# Patient Record
Sex: FEMALE | Race: WHITE | Employment: FULL TIME | URBAN - METROPOLITAN AREA
[De-identification: names, ages, dates, MRNs, and addresses within clinical notes are randomized per-mention and may not be internally consistent; named-entity substitution may affect disease eponyms.]

---

## 2018-07-16 ENCOUNTER — TRANSFERRED RECORDS (OUTPATIENT)
Dept: HEALTH INFORMATION MANAGEMENT | Facility: CLINIC | Age: 38
End: 2018-07-16

## 2018-07-17 LAB
CHOLEST SERPL-MCNC: 245 MG/DL (ref 100–199)
HDLC SERPL-MCNC: 39 MG/DL (ref 39–?)
LDLC SERPL CALC-MCNC: ABNORMAL MG/DL
TRIGL SERPL-MCNC: 448 MG/DL (ref 0–149)

## 2018-10-19 PROBLEM — F17.200 CURRENT SMOKER: Status: ACTIVE | Noted: 2018-10-19

## 2018-10-19 PROBLEM — E78.2 MIXED HYPERLIPIDEMIA: Status: ACTIVE | Noted: 2018-10-19

## 2018-10-19 PROBLEM — K21.00 GASTROESOPHAGEAL REFLUX DISEASE WITH ESOPHAGITIS: Status: ACTIVE | Noted: 2018-10-19

## 2018-10-19 PROBLEM — D25.9 FIBROID UTERUS: Status: ACTIVE | Noted: 2018-10-19

## 2018-10-23 ENCOUNTER — DOCUMENTATION ONLY (OUTPATIENT)
Dept: CARDIOLOGY | Facility: CLINIC | Age: 38
End: 2018-10-23

## 2018-11-21 ENCOUNTER — DOCUMENTATION ONLY (OUTPATIENT)
Dept: CARDIOLOGY | Facility: CLINIC | Age: 38
End: 2018-11-21

## 2018-11-28 ENCOUNTER — OFFICE VISIT (OUTPATIENT)
Dept: CARDIOLOGY | Facility: CLINIC | Age: 38
End: 2018-11-28
Payer: COMMERCIAL

## 2018-11-28 VITALS
DIASTOLIC BLOOD PRESSURE: 76 MMHG | WEIGHT: 189 LBS | SYSTOLIC BLOOD PRESSURE: 132 MMHG | OXYGEN SATURATION: 96 % | HEIGHT: 67 IN | HEART RATE: 72 BPM | BODY MASS INDEX: 29.66 KG/M2

## 2018-11-28 DIAGNOSIS — R94.31 ABNORMAL ELECTROCARDIOGRAM: ICD-10-CM

## 2018-11-28 DIAGNOSIS — E78.2 MIXED HYPERLIPIDEMIA: Primary | ICD-10-CM

## 2018-11-28 PROCEDURE — 99204 OFFICE O/P NEW MOD 45 MIN: CPT | Mod: 25 | Performed by: INTERNAL MEDICINE

## 2018-11-28 PROCEDURE — 93000 ELECTROCARDIOGRAM COMPLETE: CPT | Performed by: INTERNAL MEDICINE

## 2018-11-28 RX ORDER — ATORVASTATIN CALCIUM 20 MG/1
20 TABLET, FILM COATED ORAL DAILY
Qty: 90 TABLET | Refills: 3 | Status: SHIPPED | OUTPATIENT
Start: 2018-11-28 | End: 2019-03-25

## 2018-11-28 NOTE — PROGRESS NOTES
HPI and Plan:   See dictation(#[id number])    Orders Placed This Encounter   Procedures     Lipid Profile     ALT     Follow-Up with Cardiologist     EKG 12-lead complete w/read - Clinics (performed today)     Exercise Stress Echocardiogram       Orders Placed This Encounter   Medications     FENOFIBRATE PO     Sig: Take 160 mg by mouth daily     Fexofenadine HCl (ALLEGRA PO)     Sig: Take by mouth daily     atorvastatin (LIPITOR) 20 MG tablet     Sig: Take 1 tablet (20 mg) by mouth daily     Dispense:  90 tablet     Refill:  3       Medications Discontinued During This Encounter   Medication Reason     HYDROcodone-acetaminophen (NORCO) 5-325 MG per tablet Medication Reconciliation Clean Up         Encounter Diagnoses   Name Primary?     Mixed hyperlipidemia Yes     Abnormal electrocardiogram        CURRENT MEDICATIONS:  Current Outpatient Prescriptions   Medication Sig Dispense Refill     albuterol (PROVENTIL HFA: VENTOLIN HFA) 108 (90 BASE) MCG/ACT inhaler Inhale 2 puffs into the lungs every 6 hours.       atorvastatin (LIPITOR) 20 MG tablet Take 1 tablet (20 mg) by mouth daily 90 tablet 3     BUDESONIDE, INHALATION, 90 MCG/ACT AEPB Inhale  into the lungs.       FENOFIBRATE PO Take 160 mg by mouth daily       Fexofenadine HCl (ALLEGRA PO) Take by mouth daily       Omeprazole (PRILOSEC PO) Take 20 mg by mouth every other day        VITAMIN D, CHOLECALCIFEROL, PO Take 3,000 Units by mouth daily.         ALLERGIES     Allergies   Allergen Reactions     Cephalexin Hcl        PAST MEDICAL HISTORY:  Past Medical History:   Diagnosis Date     Asthma      Endometriosis      Gastro-oesophageal reflux disease      PONV (postoperative nausea and vomiting)     SCOP PATCH WORKS       PAST SURGICAL HISTORY:  Past Surgical History:   Procedure Laterality Date     LABIALPLASTY N/A 4/17/2015    Procedure: LABIAPLASTY;  Surgeon: Carlos Greer MD;  Location: [facility]     LAPAROSCOPIC ABLATION ENDOMETRIOSIS  6/7/2012    Procedure:  LAPAROSCOPIC ABLATION ENDOMETRIOSIS;  FULGERATION OF ENDOMETRIOSIS;  Surgeon: Carlos Greer MD;  Location: [facility]     LAPAROSCOPIC ABLATION ENDOMETRIOSIS  6/25/2014    Procedure: LAPAROSCOPIC ABLATION ENDOMETRIOSIS;  Surgeon: Carlos Greer MD;  Location:  OR     LAPAROSCOPIC TUBAL DYE STUDY  6/7/2012    Procedure: LAPAROSCOPIC TUBAL DYE STUDY;;  Surgeon: Carlos Greer MD;  Location:  [facility]     LAPAROSCOPIC TUBAL DYE STUDY  6/25/2014    Procedure: LAPAROSCOPIC TUBAL DYE STUDY;  Surgeon: Carlos Greer MD;  Location:  OR     LAPAROSCOPIC TUBAL DYE STUDY Bilateral 8/4/2015    Procedure: LAPAROSCOPIC TUBAL DYE STUDY;  Surgeon: Carlos Greer MD;  Location: [facility]     LAPAROSCOPY       LAPAROSCOPY DIAGNOSTIC (GYN)  6/7/2012    Procedure: LAPAROSCOPY DIAGNOSTIC (GYN);  LAPAROSCOPY, TUBAL DYE STUDY  WITH FULGERATION OF ENDOMETRIOSIS;  Surgeon: Carlos Greer MD;  Location: [facility]       FAMILY HISTORY:  History reviewed. No pertinent family history.    SOCIAL HISTORY:  Social History     Social History     Marital status:      Spouse name: N/A     Number of children: N/A     Years of education: N/A     Social History Main Topics     Smoking status: Current Every Day Smoker     Packs/day: 0.50     Smokeless tobacco: Never Used     Alcohol use Yes      Comment: RARELY      Drug use: No     Sexual activity: Yes     Partners: Male     Other Topics Concern     None     Social History Narrative       Review of Systems:  Skin:  Negative       Eyes:  Negative      ENT:  Negative      Respiratory:  Positive for shortness of breath asthma   Cardiovascular:  Negative Positive for    Gastroenterology: Negative      Genitourinary:  Negative      Musculoskeletal:  Negative      Neurologic:  Negative      Psychiatric:  Negative      Heme/Lymph/Imm:  Negative      Endocrine:  Negative        Physical Exam:  Vitals: /76 (BP Location: Left arm, Patient Position: Chair, Cuff Size: Adult Large)  Pulse 72  Ht 1.702 m (5'  "7\")  Wt 85.7 kg (189 lb)  SpO2 96%  BMI 29.6 kg/m2    Constitutional:           Skin:             Head:           Eyes:           Lymph:      ENT:           Neck:           Respiratory:            Cardiac:                                                           GI:           Extremities and Muscular Skeletal:                 Neurological:           Psych:             [facility]  [facility]  [address] [place] [address]                  "

## 2018-11-28 NOTE — LETTER
11/28/2018      HEATHER HERNÁNDEZ  [facility] [address]  [facility] [facility] [id number]      RE: Beatrice GT Hickman       Dear Colleague,    I had the pleasure of seeing Beatrice Hickman in the [facility].    Service Date: 11/28/2018      OFFICE PROGRESS NOTE       REASON FOR CARDIOLOGY VISIT:  Dyslipidemia.       HISTORY OF PRESENT ILLNESS:  Ms. Hickman is a pleasant 38-year-old female who has history of mixed dyslipidemia, mainly in terms of elevated triglycerides.  Has seen Dr. Masters in the past.  She also has history of tobacco abuse which unfortunately is ongoing, about half pack per day, history of endometriosis, history of infertility for which she did try treatment for several years.  The patient had lipid panel done recently and the most recent one is from 07/24 that showed total cholesterol 213, triglyceride of 423, HDL of 37, direct .  This gives her 10-year ASCVD risk score of about 6.9%.  To be noted, the patient was on fenofibrate 50 mg daily.  This was increased to 160 mg daily around about the same time when she had the lipid panel done last time.  Today, she is coming to Cardiology Clinic.  I am seeing her for the first time.  The patient tells me that her triglycerides have been elevated for a long time.  Her grandmother also had very high triglycerides.  The patient does not have any history of known coronary artery disease, pancreatitis.  No alcohol consumption.  She did have exposure to lot of hormones, in terms of infertility treatment but has not had any exposure in last 1 year or so.  She used to be fairly active doing treadmill on a regular basis but she got very busy in selling her house which she recently sold and tells me that she has again started doing exercise which includes treadmill for up to an hour.  She does not get any particular chest discomfort, although tells me that she can sometimes get exercise-induced asthma which feels like a  tightness or shortness of breath in the chest.  Unfortunately, she continues to smoke half pack per day.      PHYSICAL EXAMINATION:    VITAL SIGNS:  Blood pressure 132/76, heart rate 72 and regular, weight 189 pounds, BMI 29.66.   GENERAL:  The patient appears pleasant, comfortable.   NECK:  Normal JVP, no bruit.   CARDIOVASCULAR SYSTEM:  S1, S2 normal, no murmur, rub or gallop.     RESPIRATORY SYSTEM:  Clear to auscultation bilaterally.   GASTROINTESTINAL SYSTEM:  Abdomen soft, nontender.     EXTREMITIES:  No pitting pedal edema.    NEUROLOGICAL:  Alert, oriented x 3.   PSYCHIATRIC:  Normal affect.    SKIN:  No obvious rash.    HEENT:  No pallor or icterus.        RADIOLOGIC STUDIES:  EKG done today, was personally reviewed by me.  Showed shows sinus rhythm with some Q waves in lead V1, V2.  No previous EKG to compare.      IMPRESSION AND PLAN:  A pleasant 38-year-old female with mixed dyslipidemia, mainly in terms of high triglycerides but also low HDL, with a personal history of tobacco abuse which unfortunately is ongoing.  No history of diabetes, hypertension or family history of premature coronary artery disease.  I had a long discussion with the patient regarding the mixed dyslipidemia.  Her 10-year ASCVD score is borderline elevated around 6.9%.  We discussed option of statin, pros and cons of statin and eventually decided to use moderate intensity statin of Lipitor 20 mg daily.  Common adverse effects of statin including but not limited to risk of myositis, myalgias, liver dysfunction were discussed with the patient.  If she has any of these symptoms, especially any dark-colored urine, she needs to call our clinic and I recommend discontinuing statin at that time.  Otherwise, we will recheck her lipid panel with ALT in about 6 weeks.  Regarding abnormal EKG, clinically, she does not appear to have any typical symptoms of coronary artery disease, although she can get some breathing discomfort, according to  the patient, when she exercises which she blames on asthma.  She had a negative stress test in .  We again discussed about repeating the stress test.  It has been 8 years and the patient is agreeable for that.  We will do an exercise stress echocardiogram.  I strongly recommended her to completely quit tobacco.  We will see her back with the exercise stress echocardiogram and fasting lipid panel in about six weeks or so.   1.  Mixed dyslipidemia, 10-year ASCVD risk score of around 6.9%.   2.  Abnormal EKG, possible Q waves in V1, V2.  Clinically, no typical anginal symptoms.   3.  Tobacco abuse, unfortunately ongoing.      RECOMMENDATIONS:   1.  Exercise stress echocardiogram.   2.  Lipitor 20 mg daily.     3.  Fasting lipid panel with ALT in about 6 weeks' time.   4.  Tobacco cessation strongly recommended.  Approximately 3 minutes were spent discussing about tobacco cessation.         SANDY WESLEY MD             D: 2018   T: 2018   MT: UZIEL      Name:     GLORIA GONZALEZ   MRN:      [id number]        Account:      [id number]   :      1980           Service Date: 2018      Document: [id number]         Outpatient Encounter Prescriptions as of 2018   Medication Sig Dispense Refill     albuterol (PROVENTIL HFA: VENTOLIN HFA) 108 (90 BASE) MCG/ACT inhaler Inhale 2 puffs into the lungs every 6 hours.       atorvastatin (LIPITOR) 20 MG tablet Take 1 tablet (20 mg) by mouth daily 90 tablet 3     BUDESONIDE, INHALATION, 90 MCG/ACT AEPB Inhale  into the lungs.       FENOFIBRATE PO Take 160 mg by mouth daily       Fexofenadine HCl (ALLEGRA PO) Take by mouth daily       Omeprazole (PRILOSEC PO) Take 20 mg by mouth every other day        VITAMIN D, CHOLECALCIFEROL, PO Take 3,000 Units by mouth daily.       [DISCONTINUED] HYDROcodone-acetaminophen (NORCO) 5-325 MG per tablet Take 1-2 tablets by mouth every 4 hours as needed for other (Moderate to Severe Pain) 10 tablet 0     No  facility-administered encounter medications on file as of 11/28/2018.        Again, thank you for allowing me to participate in the care of your patient.      Sincerely,    Mark Meza MD     [facility]

## 2018-11-28 NOTE — PROGRESS NOTES
Service Date: 11/28/2018      OFFICE PROGRESS NOTE       REASON FOR CARDIOLOGY VISIT:  Dyslipidemia.       HISTORY OF PRESENT ILLNESS:  Ms. Hickman is a pleasant 38-year-old female who has history of mixed dyslipidemia, mainly in terms of elevated triglycerides.  Has seen Dr. Mastesr in the past.  She also has history of tobacco abuse which unfortunately is ongoing, about half pack per day, history of endometriosis, history of infertility for which she did try treatment for several years.  The patient had lipid panel done recently and the most recent one is from 07/24 that showed total cholesterol 213, triglyceride of 423, HDL of 37, direct .  This gives her 10-year ASCVD risk score of about 6.9%.  To be noted, the patient was on fenofibrate 50 mg daily.  This was increased to 160 mg daily around about the same time when she had the lipid panel done last time.  Today, she is coming to Cardiology Clinic.  I am seeing her for the first time.  The patient tells me that her triglycerides have been elevated for a long time.  Her grandmother also had very high triglycerides.  The patient does not have any history of known coronary artery disease, pancreatitis.  No alcohol consumption.  She did have exposure to lot of hormones, in terms of infertility treatment but has not had any exposure in last 1 year or so.  She used to be fairly active doing treadmill on a regular basis but she got very busy in selling her house which she recently sold and tells me that she has again started doing exercise which includes treadmill for up to an hour.  She does not get any particular chest discomfort, although tells me that she can sometimes get exercise-induced asthma which feels like a tightness or shortness of breath in the chest.  Unfortunately, she continues to smoke half pack per day.      PHYSICAL EXAMINATION:    VITAL SIGNS:  Blood pressure 132/76, heart rate 72 and regular, weight 189 pounds, BMI 29.66.   GENERAL:   The patient appears pleasant, comfortable.   NECK:  Normal JVP, no bruit.   CARDIOVASCULAR SYSTEM:  S1, S2 normal, no murmur, rub or gallop.     RESPIRATORY SYSTEM:  Clear to auscultation bilaterally.   GASTROINTESTINAL SYSTEM:  Abdomen soft, nontender.     EXTREMITIES:  No pitting pedal edema.    NEUROLOGICAL:  Alert, oriented x 3.   PSYCHIATRIC:  Normal affect.    SKIN:  No obvious rash.    HEENT:  No pallor or icterus.        RADIOLOGIC STUDIES:  EKG done today, was personally reviewed by me.  Showed shows sinus rhythm with some Q waves in lead V1, V2.  No previous EKG to compare.      IMPRESSION AND PLAN:  A pleasant 38-year-old female with mixed dyslipidemia, mainly in terms of high triglycerides but also low HDL, with a personal history of tobacco abuse which unfortunately is ongoing.  No history of diabetes, hypertension or family history of premature coronary artery disease.  I had a long discussion with the patient regarding the mixed dyslipidemia.  Her 10-year ASCVD score is borderline elevated around 6.9%.  We discussed option of statin, pros and cons of statin and eventually decided to use moderate intensity statin of Lipitor 20 mg daily.  Common adverse effects of statin including but not limited to risk of myositis, myalgias, liver dysfunction were discussed with the patient.  If she has any of these symptoms, especially any dark-colored urine, she needs to call our clinic and I recommend discontinuing statin at that time.  Otherwise, we will recheck her lipid panel with ALT in about 6 weeks.  Regarding abnormal EKG, clinically, she does not appear to have any typical symptoms of coronary artery disease, although she can get some breathing discomfort, according to the patient, when she exercises which she blames on asthma.  She had a negative stress test in 2010.  We again discussed about repeating the stress test.  It has been 8 years and the patient is agreeable for that.  We will do an exercise  stress echocardiogram.  I strongly recommended her to completely quit tobacco.  We will see her back with the exercise stress echocardiogram and fasting lipid panel in about six weeks or so.   1.  Mixed dyslipidemia, 10-year ASCVD risk score of around 6.9%.   2.  Abnormal EKG, possible Q waves in V1, V2.  Clinically, no typical anginal symptoms.   3.  Tobacco abuse, unfortunately ongoing.      RECOMMENDATIONS:   1.  Exercise stress echocardiogram.   2.  Lipitor 20 mg daily.     3.  Fasting lipid panel with ALT in about 6 weeks' time.   4.  Tobacco cessation strongly recommended.  Approximately 3 minutes were spent discussing about tobacco cessation.         SANDY WESLEY MD             D: 2018   T: 2018   MT: UZIEL      Name:     GLORIA GONZALEZ   MRN:      [id number]        Account:      [id number]   :      1980           Service Date: 2018      Document: [id number]

## 2018-11-28 NOTE — MR AVS SNAPSHOT
"              After Visit Summary   11/28/2018    Beatrice Hickman    MRN: [id number]           Patient Information     Date Of Birth          1980        Visit Information        Provider Department      11/28/2018 8:45 AM Mark Meza MD [facility]        Today's Diagnoses     Mixed hyperlipidemia    -  1    Abnormal electrocardiogram           Follow-ups after your visit        Additional Services     Follow-Up with Cardiologist                 Future tests that were ordered for you today     Open Future Orders        Priority Expected Expires Ordered    Follow-Up with Cardiologist Routine 1/9/2019 11/28/2019 11/28/2018    Lipid Profile Routine 1/10/2019 11/28/2019 11/28/2018    ALT Routine 12/28/2018 11/28/2019 11/28/2018    Exercise Stress Echocardiogram Routine 12/5/2018 11/28/2019 11/28/2018            Who to contact     If you have questions or need follow up information about today's clinic visit or your schedule please contact [facility] directly at [phone].  Normal or non-critical lab and imaging results will be communicated to you by [employer], letter or phone within 4 business days after the clinic has received the results. If you do not hear from us within 7 days, please contact the clinic through dakickt or phone. If you have a critical or abnormal lab result, we will notify you by phone as soon as possible.  Submit refill requests through [employer] or call your pharmacy and they will forward the refill request to us. Please allow 3 business days for your refill to be completed.          Additional Information About Your Visit        [employer] Information     [employer] lets you send messages to your doctor, view your test results, renew your prescriptions, schedule appointments and more. To sign up, go to [email or web address] . Click on \"Log in\" on the left side of the screen, which will take you to the Welcome page. " "Then click on \"Sign up Now\" on the right side of the page.     You will be asked to enter the access code listed below, as well as some personal information. Please follow the directions to create your username and password.     Your access code is: [email or web address]  Expires: 2019  8:15 AM     Your access code will  in 90 days. If you need help or a new code, please call your [place] clinic or [phone].        Care EveryWhere ID     This is your Care EveryWhere ID. This could be used by other organizations to access your [place] medical records  [id number]        Your Vitals Were     Pulse Height Pulse Oximetry BMI (Body Mass Index)          72 1.702 m (5' 7\") 96% 29.6 kg/m2         Blood Pressure from Last 3 Encounters:   18 132/76   08/04/15 108/68   04/17/15 114/56    Weight from Last 3 Encounters:   18 85.7 kg (189 lb)   08/04/15 80 kg (176 lb 5.9 oz)   14 80.2 kg (176 lb 11.2 oz)              We Performed the Following     EKG 12-lead complete w/read - Clinics (performed today)          Today's Medication Changes          These changes are accurate as of 18  9:25 AM.  If you have any questions, ask your nurse or doctor.               Start taking these medicines.        Dose/Directions    atorvastatin 20 MG tablet   Commonly known as:  LIPITOR   Used for:  Mixed hyperlipidemia   Started by:  Mark Meza MD        Dose:  20 mg   Take 1 tablet (20 mg) by mouth daily   Quantity:  90 tablet   Refills:  3            Where to get your medicines      These medications were sent to [id number] - [facility] [address] AT [facility] OF [address] [place] [address]  [address] [facility] [address]     Phone:  [phone]     atorvastatin 20 MG tablet                Primary Care Provider Office Phone # Fax #    Nika Kyle [phone]       [address] [address]        Equal Access to Services     [facility]" GAAR : Hadii aad ku harris Mauro, waaxda luqadaha, qaybta katrue cox, ken chris randalllang rajanabidarebecca mckinney . So [facility] [phone].    ATENCIÓN: Si habla español, tiene a payne disposición servicios gratuitos de asistencia lingüística. Llame al [phone].    We comply with applicable federal civil rights laws and [place] laws. We do not discriminate on the basis of race, color, national origin, age, disability, sex, sexual orientation, or gender identity.            Thank you!     Thank you for choosing [facility]  for your care. Our goal is always to provide you with excellent care. Hearing back from our patients is one way we can continue to improve our services. Please take a few minutes to complete the written survey that you may receive in the mail after your visit with us. Thank you!             Your Updated Medication List - Protect others around you: Learn how to safely use, store and throw away your medicines at www.disposemymeds.org.          This list is accurate as of 11/28/18  9:25 AM.  Always use your most recent med list.                   Brand Name Dispense Instructions for use Diagnosis    albuterol 108 (90 Base) MCG/ACT inhaler    PROAIR HFA/PROVENTIL HFA/VENTOLIN HFA     Inhale 2 puffs into the lungs every 6 hours.        ALLEGRA PO      Take by mouth daily        atorvastatin 20 MG tablet    LIPITOR    90 tablet    Take 1 tablet (20 mg) by mouth daily    Mixed hyperlipidemia       budesonide 90 MCG/ACT inhaler    PULMICORT FLEXHALER     Inhale  into the lungs.        FENOFIBRATE PO      Take 160 mg by mouth daily        PRILOSEC PO      Take 20 mg by mouth every other day        VITAMIN D (CHOLECALCIFEROL) PO      Take 3,000 Units by mouth daily.

## 2018-11-28 NOTE — LETTER
11/28/2018    HEATHER HERNÁNDEZ  [facility] [address]  [facility] [address]    RE: Beatrice Hickman       Dear Colleague,    I had the pleasure of seeing Beatrice Hickman in the [facility].    HPI and Plan:   See dictation(#[id number])    Orders Placed This Encounter   Procedures     Lipid Profile     ALT     Follow-Up with Cardiologist     EKG 12-lead complete w/read - Clinics (performed today)     Exercise Stress Echocardiogram       Orders Placed This Encounter   Medications     FENOFIBRATE PO     Sig: Take 160 mg by mouth daily     Fexofenadine HCl (ALLEGRA PO)     Sig: Take by mouth daily     atorvastatin (LIPITOR) 20 MG tablet     Sig: Take 1 tablet (20 mg) by mouth daily     Dispense:  90 tablet     Refill:  3       Medications Discontinued During This Encounter   Medication Reason     HYDROcodone-acetaminophen (NORCO) 5-325 MG per tablet Medication Reconciliation Clean Up         Encounter Diagnoses   Name Primary?     Mixed hyperlipidemia Yes     Abnormal electrocardiogram        CURRENT MEDICATIONS:  Current Outpatient Prescriptions   Medication Sig Dispense Refill     albuterol (PROVENTIL HFA: VENTOLIN HFA) 108 (90 BASE) MCG/ACT inhaler Inhale 2 puffs into the lungs every 6 hours.       atorvastatin (LIPITOR) 20 MG tablet Take 1 tablet (20 mg) by mouth daily 90 tablet 3     BUDESONIDE, INHALATION, 90 MCG/ACT AEPB Inhale  into the lungs.       FENOFIBRATE PO Take 160 mg by mouth daily       Fexofenadine HCl (ALLEGRA PO) Take by mouth daily       Omeprazole (PRILOSEC PO) Take 20 mg by mouth every other day        VITAMIN D, CHOLECALCIFEROL, PO Take 3,000 Units by mouth daily.         ALLERGIES     Allergies   Allergen Reactions     Cephalexin Hcl        PAST MEDICAL HISTORY:  Past Medical History:   Diagnosis Date     Asthma      Endometriosis      Gastro-oesophageal reflux disease      PONV (postoperative nausea and vomiting)     SCOP PATCH WORKS       PAST SURGICAL  HISTORY:  Past Surgical History:   Procedure Laterality Date     LABIALPLASTY N/A 4/17/2015    Procedure: LABIAPLASTY;  Surgeon: Carlos Greer MD;  Location: [facility] OR     LAPAROSCOPIC ABLATION ENDOMETRIOSIS  6/7/2012    Procedure: LAPAROSCOPIC ABLATION ENDOMETRIOSIS;  FULGERATION OF ENDOMETRIOSIS;  Surgeon: Carlos Greer MD;  Location: [facility]     LAPAROSCOPIC ABLATION ENDOMETRIOSIS  6/25/2014    Procedure: LAPAROSCOPIC ABLATION ENDOMETRIOSIS;  Surgeon: Carlos Greer MD;  Location:  OR     LAPAROSCOPIC TUBAL DYE STUDY  6/7/2012    Procedure: LAPAROSCOPIC TUBAL DYE STUDY;;  Surgeon: Carlos Greer MD;  Location: [facility]     LAPAROSCOPIC TUBAL DYE STUDY  6/25/2014    Procedure: LAPAROSCOPIC TUBAL DYE STUDY;  Surgeon: Carlos Greer MD;  Location:  OR     LAPAROSCOPIC TUBAL DYE STUDY Bilateral 8/4/2015    Procedure: LAPAROSCOPIC TUBAL DYE STUDY;  Surgeon: Carlos Greer MD;  Location:  OR     LAPAROSCOPY       LAPAROSCOPY DIAGNOSTIC (GYN)  6/7/2012    Procedure: LAPAROSCOPY DIAGNOSTIC (GYN);  LAPAROSCOPY, TUBAL DYE STUDY  WITH FULGERATION OF ENDOMETRIOSIS;  Surgeon: Carlos Greer MD;  Location: [facility]       FAMILY HISTORY:  History reviewed. No pertinent family history.    SOCIAL HISTORY:  Social History     Social History     Marital status:      Spouse name: N/A     Number of children: N/A     Years of education: N/A     Social History Main Topics     Smoking status: Current Every Day Smoker     Packs/day: 0.50     Smokeless tobacco: Never Used     Alcohol use Yes      Comment: RARELY      Drug use: No     Sexual activity: Yes     Partners: Male     Other Topics Concern     None     Social History Narrative       Review of Systems:  Skin:  Negative       Eyes:  Negative      ENT:  Negative      Respiratory:  Positive for shortness of breath asthma   Cardiovascular:  Negative Positive for    Gastroenterology: Negative      Genitourinary:  Negative      Musculoskeletal:  Negative      Neurologic:   "Negative      Psychiatric:  Negative      Heme/Lymph/Imm:  Negative      Endocrine:  Negative        Physical Exam:  Vitals: /76 (BP Location: Left arm, Patient Position: Chair, Cuff Size: Adult Large)  Pulse 72  Ht 1.702 m (5' 7\")  Wt 85.7 kg (189 lb)  SpO2 96%  BMI 29.6 kg/m2    Constitutional:           Skin:             Head:           Eyes:           Lymph:      ENT:           Neck:           Respiratory:            Cardiac:                                                           GI:           Extremities and Muscular Skeletal:                 Neurological:           Psych:             [place] [address]                    Thank you for allowing me to participate in the care of your patient.      Sincerely,     Mark Meza MD     [facility]    cc:   [place] [address]        "

## 2019-03-25 ENCOUNTER — HOSPITAL ENCOUNTER (EMERGENCY)
Facility: CLINIC | Age: 39
Discharge: HOME | End: 2019-03-25
Attending: NURSE PRACTITIONER | Admitting: NURSE PRACTITIONER
Payer: COMMERCIAL

## 2019-03-25 ENCOUNTER — APPOINTMENT (OUTPATIENT)
Dept: GENERAL RADIOLOGY | Facility: CLINIC | Age: 39
End: 2019-03-25
Attending: NURSE PRACTITIONER
Payer: COMMERCIAL

## 2019-03-25 VITALS
OXYGEN SATURATION: 97 % | HEART RATE: 85 BPM | TEMPERATURE: 98 F | RESPIRATION RATE: 16 BRPM | SYSTOLIC BLOOD PRESSURE: 181 MMHG | DIASTOLIC BLOOD PRESSURE: 119 MMHG | BODY MASS INDEX: 29.76 KG/M2 | WEIGHT: 190 LBS

## 2019-03-25 DIAGNOSIS — M54.2 CERVICALGIA: ICD-10-CM
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
## 2019-03-25 PROCEDURE — 25000132 ZZH RX MED GY IP 250 OP 250 PS 637: Performed by: NURSE PRACTITIONER

## 2019-03-25 PROCEDURE — 72040 X-RAY EXAM NECK SPINE 2-3 VW: CPT

## 2019-03-25 PROCEDURE — 99283 EMERGENCY DEPT VISIT LOW MDM: CPT | Mod: Z6 | Performed by: NURSE PRACTITIONER

## 2019-03-25 PROCEDURE — 99283 EMERGENCY DEPT VISIT LOW MDM: CPT

## 2019-03-25 PROCEDURE — 72100 X-RAY EXAM L-S SPINE 2/3 VWS: CPT

## 2019-03-25 PROCEDURE — 25000131 ZZH RX MED GY IP 250 OP 636 PS 637: Performed by: NURSE PRACTITIONER

## 2019-03-25 PROCEDURE — 72072 X-RAY EXAM THORAC SPINE 3VWS: CPT

## 2019-03-25 RX ORDER — SUMATRIPTAN 50 MG/1
50 TABLET, FILM COATED ORAL
Qty: 10 TABLET | Refills: 0 | Status: SHIPPED | OUTPATIENT
Start: 2019-03-25

## 2019-03-25 RX ORDER — METRONIDAZOLE 7.5 MG/G
GEL TOPICAL
COMMUNITY
Start: 2019-02-12

## 2019-03-25 RX ORDER — FENOFIBRATE 160 MG/1
160 TABLET ORAL DAILY
COMMUNITY

## 2019-03-25 RX ORDER — ONDANSETRON 4 MG/1
4 TABLET, ORALLY DISINTEGRATING ORAL ONCE
Status: COMPLETED | OUTPATIENT
Start: 2019-03-25 | End: 2019-03-25

## 2019-03-25 RX ORDER — FENOFIBRATE 145 MG/1
145 TABLET, COATED ORAL DAILY
Refills: 0 | COMMUNITY
Start: 2018-07-18

## 2019-03-25 RX ORDER — ACETAMINOPHEN 500 MG
1000 TABLET ORAL EVERY 6 HOURS PRN
Qty: 60 TABLET | Refills: 0 | COMMUNITY
Start: 2019-03-25 | End: 2019-04-01

## 2019-03-25 RX ORDER — DROSPIRENONE AND ETHINYL ESTRADIOL 0.03MG-3MG
1 KIT ORAL DAILY
COMMUNITY
Start: 2018-01-07

## 2019-03-25 RX ORDER — TIZANIDINE 2 MG/1
2 TABLET ORAL 3 TIMES DAILY PRN
Qty: 30 TABLET | Refills: 0 | Status: SHIPPED | OUTPATIENT
Start: 2019-03-25

## 2019-03-25 RX ORDER — IBUPROFEN 400 MG/1
800 TABLET, FILM COATED ORAL ONCE
Status: COMPLETED | OUTPATIENT
Start: 2019-03-25 | End: 2019-03-25

## 2019-03-25 RX ORDER — IBUPROFEN 600 MG/1
600 TABLET, FILM COATED ORAL EVERY 8 HOURS PRN
Qty: 60 TABLET | Refills: 0 | Status: SHIPPED | OUTPATIENT
Start: 2019-03-25 | End: 2019-04-02

## 2019-03-25 RX ORDER — ACETAMINOPHEN 325 MG/1
975 TABLET ORAL ONCE
Status: COMPLETED | OUTPATIENT
Start: 2019-03-25 | End: 2019-03-25

## 2019-03-25 RX ORDER — SUMATRIPTAN 50 MG/1
50 TABLET, FILM COATED ORAL PRN
COMMUNITY
Start: 2017-10-09 | End: 2019-03-25

## 2019-03-25 RX ADMIN — ACETAMINOPHEN 975 MG: 325 TABLET, FILM COATED ORAL at 11:14

## 2019-03-25 RX ADMIN — IBUPROFEN 800 MG: 400 TABLET ORAL at 11:15

## 2019-03-25 RX ADMIN — ONDANSETRON 4 MG: 4 TABLET, ORALLY DISINTEGRATING ORAL at 11:15

## 2019-03-25 ASSESSMENT — ENCOUNTER SYMPTOMS
NAUSEA: 0
RHINORRHEA: 0
DIFFICULTY URINATING: 0
CHILLS: 0
WHEEZING: 0
FEVER: 0
DIARRHEA: 0
PALPITATIONS: 0
NECK PAIN: 1
CONFUSION: 0
SORE THROAT: 0
COUGH: 0
CONSTIPATION: 0
DYSURIA: 0
ABDOMINAL PAIN: 0
BACK PAIN: 1
DIAPHORESIS: 0
VOMITING: 0
HEADACHES: 0
EYE REDNESS: 0
SHORTNESS OF BREATH: 0

## 2019-03-25 NOTE — ED AVS SNAPSHOT
[facility]  [address] [place] [address]  Phone:  [phone]  Fax:  [phone]                                    Beatrice Hickman   MRN: [id number]    Department:  [facility]   Date of Visit:  3/25/2019           After Visit Summary Signature Page    I have received my discharge instructions, and my questions have been answered. I have discussed any challenges I see with this plan with the nurse or doctor.    ..........................................................................................................................................  Patient/Patient Representative Signature      ..........................................................................................................................................  Patient Representative Print Name and Relationship to Patient    ..................................................               ................................................  Date                                   Time    ..........................................................................................................................................  Reviewed by Signature/Title    ...................................................              ..............................................  Date                                               Time          22EPIC Rev 08/18
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
## 2019-03-25 NOTE — DISCHARGE INSTRUCTIONS
Use ibuprofen 600 mg up to 4 times per day if needed for pain. Stop if it is causing nausea or abdominal pain.   You were prescribed tizanidine that is a muscle relaxer and you can take this up to 3 times daily as needed for muscle spasms.  You were prescribed extra strength Tylenol and you can take 1 or 2 tablets every 6 hours as needed for pain management.

## 2021-05-27 ENCOUNTER — RECORDS - HEALTHEAST (OUTPATIENT)
Dept: ADMINISTRATIVE | Facility: CLINIC | Age: 41
End: 2021-05-27

## 2021-05-29 ENCOUNTER — RECORDS - HEALTHEAST (OUTPATIENT)
Dept: ADMINISTRATIVE | Facility: CLINIC | Age: 41
End: 2021-05-29